# Patient Record
Sex: FEMALE | Race: WHITE | Employment: OTHER | URBAN - METROPOLITAN AREA
[De-identification: names, ages, dates, MRNs, and addresses within clinical notes are randomized per-mention and may not be internally consistent; named-entity substitution may affect disease eponyms.]

---

## 2021-03-19 ENCOUNTER — OFFICE VISIT (OUTPATIENT)
Dept: ORTHOPEDIC SURGERY | Age: 71
End: 2021-03-19
Payer: MEDICARE

## 2021-03-19 VITALS — HEIGHT: 62 IN | WEIGHT: 240 LBS | BODY MASS INDEX: 44.16 KG/M2

## 2021-03-19 DIAGNOSIS — G56.03 BILATERAL CARPAL TUNNEL SYNDROME: Primary | ICD-10-CM

## 2021-03-19 PROCEDURE — 99204 OFFICE O/P NEW MOD 45 MIN: CPT | Performed by: ORTHOPAEDIC SURGERY

## 2021-03-19 RX ORDER — LOSARTAN POTASSIUM 100 MG/1
100 TABLET ORAL DAILY
COMMUNITY
Start: 2021-02-23

## 2021-03-19 RX ORDER — ALLOPURINOL 300 MG/1
TABLET ORAL
COMMUNITY
Start: 2021-03-01

## 2021-03-19 RX ORDER — DILTIAZEM HYDROCHLORIDE 120 MG/1
120 CAPSULE, COATED, EXTENDED RELEASE ORAL DAILY
COMMUNITY
Start: 2021-03-08

## 2021-03-19 RX ORDER — FOLIC ACID 1 MG/1
1 TABLET ORAL DAILY
COMMUNITY

## 2021-03-19 RX ORDER — L-METHYLFOLATE-ALGAE-VIT B12-B6 CAP 3-90.314-2-35 MG 3-90.314-2-35 MG
CAP ORAL DAILY
COMMUNITY

## 2021-03-19 RX ORDER — ATORVASTATIN CALCIUM 20 MG/1
20 TABLET, FILM COATED ORAL DAILY
COMMUNITY
Start: 2021-02-10

## 2021-03-19 RX ORDER — HYDROCHLOROTHIAZIDE 25 MG/1
25 TABLET ORAL DAILY
COMMUNITY

## 2021-03-19 NOTE — PROGRESS NOTES
Natalie Jay is a 78 y.o. female, who presents   Chief Complaint   Patient presents with    Carpal Tunnel     new patient consult for bilateral carpal tunnel. patient states that the left feels worse than the right. HPI[de-identified] Bilateral hand numbness and tingling is been present for some time. The left hand is much worse than the right. She does have a feeling of weakness in the left hand and also has dropped objects. It does wake her at night with tingling and numbness. There has been little if any treatment for this. She has had electrodiagnostic studies done in [place]. She denies other problems with the other upper extremity such as shoulder and elbow problems. The patient is right-hand dominant. Allergies; medications; past medical, surgical, family, and social history; and problem list have been reviewed today and updated as indicated in this encounter - see below following Ortho specifics. Musculoskeletal: Skin condition and circulation is good in both upper extremities. Shoulder and elbow motion and stability are good without pain. Wrist finger and hand motion are good. There is slight decrease in relative muscle mass in the intrinsics of the left hand which is consistent with her right hand dominance. There is some decrease in print and perspiration pattern in the left hand median innervated fingers.  and pinch strength are still good in the hands. Pinprick perception is slightly diminished in the median distribution of the left hand. Provocative tests for median compression such as percussion over the carpal tunnel and wrist flexion as well as arm elevation are positive on the left side. Radiologic Studies: Electrodiagnostic testing done 3/1/2021 showed changes consistent with carpal tunnel syndrome bilaterally, much worse on the left side gauged as moderate by reporting physician. ASSESSMENT:  Giuseppe Brando was seen today for carpal tunnel.     Diagnoses and all orders for this visit:    Bilateral carpal tunnel syndrome     Treatment alternatives were reviewed including medical and physical therapies, injections, and surgical options, expected risks benefits and likely outcome of each were discussed in detail, questions asked and answered and understood. We discussed the symptoms as well as physical findings and the electrodiagnostic testing. The patient was interested in the cause and the anatomy was described as well. We discussed treatment options including bracing, anti-inflammatories by mouth, carpal tunnel injection with corticosteroids and surgical decompression. Andrey Salazar indicates that she would like to have the left hand taken care of in a definitive fashion. She would like to try bracing on the right hand. PLAN: We will schedule left hand carpal tunnel decompression as an outpatient surgical procedure. The patient should get a carpal tunnel brace at any local pharmacy facility. There is no problem list on file for this patient. History reviewed. No pertinent past medical history. History reviewed. No pertinent surgical history. Current Outpatient Medications   Medication Sig Dispense Refill    allopurinol (ZYLOPRIM) 300 MG tablet 150mg daily      atorvastatin (LIPITOR) 20 MG tablet       dilTIAZem (CARDIZEM CD) 120 MG extended release capsule       folic acid (FOLVITE) 1 MG tablet Take 1 mg by mouth daily      hydroCHLOROthiazide (HYDRODIURIL) 25 MG tablet Take 25 mg by mouth daily      losartan (COZAAR) 100 MG tablet       Multiple Vitamin (MULTIVITAMIN ADULT PO) Take by mouth daily      MEGARED OMEGA-3 KRILL OIL PO Take by mouth daily      L-methylfolate-B6-B12 (METANX) 2-01.646-6-17 MG CAPS capsule Take by mouth daily       No current facility-administered medications for this visit.         Allergies   Allergen Reactions    Lisinopril Other (See Comments)    Other Other (See Comments)       Social History     Socioeconomic History  Marital status: Single     Spouse name: None    Number of children: None    Years of education: None    Highest education level: None   Occupational History    None   Social Needs    Financial resource strain: None    Food insecurity     Worry: None     Inability: None    Transportation needs     Medical: None     Non-medical: None   Tobacco Use    Smoking status: Never Smoker    Smokeless tobacco: Never Used   Substance and Sexual Activity    Alcohol use: None    Drug use: None    Sexual activity: None   Lifestyle    Physical activity     Days per week: None     Minutes per session: None    Stress: None   Relationships    Social connections     Talks on phone: None     Gets together: None     Attends Synagogue service: None     Active member of club or organization: None     Attends meetings of clubs or organizations: None     Relationship status: None    Intimate partner violence     Fear of current or ex partner: None     Emotionally abused: None     Physically abused: None     Forced sexual activity: None   Other Topics Concern    None   Social History Narrative    None       History reviewed. No pertinent family history. Review of Systems:   As follows except as previously noted in HPI:  Constitutional: Negative for chills, diaphoresis,  fever   Respiratory: Negative for cough, shortness of breath and wheezing. Cardiovascular: Negative for chest pain and palpitations. Neurological: Negative for dizziness, syncope,   GI / : abdominal pain or cramping  Musculoskeletal: see HPI       Objective:   Physical Exam   Constitutional: Oriented to person, place, and time. and appears well-developed and well-nourished. :   Head: Normocephalic and atraumatic. Neck: Neck supple. Eyes: EOM are normal.   Pulmonary/Chest: Effort normal.  No respiratory distress, no wheezes. Neurological: Alert and oriented to person  Skin: Skin is warm and dry.                Deidra Garzon,     3/19/21 11:51 AM    All reasonable efforts have been made to minimize the risk of errors that may occur in the use of voice recognition and other electronic means of charting.

## 2021-04-09 ENCOUNTER — HOSPITAL ENCOUNTER (OUTPATIENT)
Age: 71
Discharge: HOME | End: 2021-04-09
Payer: MEDICARE

## 2021-04-09 LAB
EKG ATRIAL RATE: 70 BPM
EKG P AXIS: 57 DEGREES
EKG P-R INTERVAL: 192 MS
EKG Q-T INTERVAL: 422 MS
EKG QRS DURATION: 140 MS
EKG QTC CALCULATION (BAZETT): 455 MS
EKG R AXIS: -30 DEGREES
EKG T AXIS: 102 DEGREES
EKG VENTRICULAR RATE: 70 BPM

## 2021-04-09 PROCEDURE — 93005 ELECTROCARDIOGRAM TRACING: CPT | Performed by: GENERAL PRACTICE

## 2021-04-19 ENCOUNTER — TELEPHONE (OUTPATIENT)
Dept: ORTHOPEDIC SURGERY | Age: 71
End: 2021-04-19

## 2021-04-21 NOTE — PROGRESS NOTES
[facility] Cardiology consult  Dr. Luis Fernando Suero      Reason for Consult: Cardiac Clearance  Referring Physician: Rahel Mesa DO     CHIEF COMPLAINT:   Chief Complaint   Patient presents with    Cardiac Clearance     Patient needs CC for carpal tunnel for April 29, 2021  by Dr Alen Albright. Patient has SOB on exertion. HISTORY OF PRESENT ILLNESS:   78year old female with no significant past medical history is here to obtain cardiac clearance due to an abnormal EKG. Shortness of breath with exertion, patient denies any chest pain, no lightheadedness, no dizziness, no palpitations, no pedal edema, no PND, no orthopnea, no syncope, no presyncopal episodes. Past Medical History:   Diagnosis Date    Asthma     Hypertension          Past Surgical History:   Procedure Laterality Date    APPENDECTOMY      GALLBLADDER SURGERY      HEEL SPUR SURGERY Left          Current Outpatient Medications   Medication Sig Dispense Refill    Methotrexate 15 MG/0.6ML SOSY Inject into the skin once a week      aspirin 81 MG chewable tablet Take 81 mg by mouth daily      clobetasol propionate 0.05 % LOTN lotion Apply topically 2 times daily      Boswellia-Glucosamine-Vit D (OSTEO BI-FLEX ONE PER DAY PO) Take by mouth daily      allopurinol (ZYLOPRIM) 300 MG tablet 150mg daily      atorvastatin (LIPITOR) 20 MG tablet Take 20 mg by mouth daily       dilTIAZem (CARDIZEM CD) 120 MG extended release capsule       folic acid (FOLVITE) 1 MG tablet Take 1 mg by mouth daily      hydroCHLOROthiazide (HYDRODIURIL) 25 MG tablet Take 25 mg by mouth daily      losartan (COZAAR) 100 MG tablet Take 100 mg by mouth daily       Multiple Vitamin (MULTIVITAMIN ADULT PO) Take by mouth daily      MEGARED OMEGA-3 KRILL OIL PO Take by mouth daily      L-methylfolate-B6-B12 (METANX) 4-11.049-9-70 MG CAPS capsule Take by mouth daily       No current facility-administered medications for this visit.           Allergies as of 04/22/2021 - Review Complete 04/22/2021   Allergen Reaction Noted    Lisinopril Other (See Comments) 05/21/2017    Other Other (See Comments) 05/21/2017       Social History     Socioeconomic History    Marital status: Single     Spouse name: Not on file    Number of children: Not on file    Years of education: Not on file    Highest education level: Not on file   Occupational History    Not on file   Social Needs    Financial resource strain: Not on file    Food insecurity     Worry: Not on file     Inability: Not on file    Transportation needs     Medical: Not on file     Non-medical: Not on file   Tobacco Use    Smoking status: Never Smoker    Smokeless tobacco: Never Used   Substance and Sexual Activity    Alcohol use: Not Currently     Comment: No caffeine.  Pop once a week    Drug use: Never    Sexual activity: Not on file   Lifestyle    Physical activity     Days per week: Not on file     Minutes per session: Not on file    Stress: Not on file   Relationships    Social connections     Talks on phone: Not on file     Gets together: Not on file     Attends [employer] service: Not on file     Active member of club or organization: Not on file     Attends meetings of clubs or organizations: Not on file     Relationship status: Not on file    Intimate partner violence     Fear of current or ex partner: Not on file     Emotionally abused: Not on file     Physically abused: Not on file     Forced sexual activity: Not on file   Other Topics Concern    Not on file   Social History Narrative    Not on file       Family History   Problem Relation Age of Onset    Stroke Father        REVIEW OF SYSTEMS:     CONSTITUTIONAL:  negative for  fevers, chills, sweats and fatigue  EYES:  negative for  double vision, blurred vision and blind spots  HEENT:  negative for  tinnitus, earaches, nasal congestion and epistaxis  RESPIRATORY:  negative for  dry cough, cough with sputum, dyspnea, wheezing and hemoptysis  CARDIOVASCULAR: as per HPI  GASTROINTESTINAL:  negative for nausea, vomiting, diarrhea, constipation, pruritus and jaundice  GENITOURINARY:  negative for frequency, dysuria, nocturia, urinary incontinence and hesitancy  HEMATOLOGIC/LYMPHATIC:  negative for easy bruising, bleeding, lymphadenopathy and petechiae  ALLERGIC/IMMUNOLOGIC:  negative for urticaria, hay fever and angioedema  ENDOCRINE:  negative for heat intolerance, cold intolerance, tremor, hair loss and diabetic symptoms including neither polyuria nor polydipsia nor blurred vision  MUSCULOSKELETAL:  negative for  myalgias, arthralgias, joint swelling, stiff joints and decreased range of motion  NEUROLOGICAL:  negative for memory problems, speech problems, visual disturbance, dysphagia, weakness and numbness      PHYSICAL EXAM:   CONSTITUTIONAL:  awake, alert, cooperative, no apparent distress, and appears stated age  EYES:  lids and lashes normal and pupils equal, round and reactive to light, anicteric sclerae  HEAD:  normocepalic, without obvious abnormality, atraumatic, pink, moist mucous membranes. NECK:  Supple, symmetrical, trachea midline, no adenopathy, thyroid symmetric, not enlarged and no tenderness, skin normal  HEMATOLOGIC/LYMPHATICS:  no cervical lymphadenopathy and no supraclavicular lymphadenopathy  LUNGS:  No increased work of breathing, good air exchange, clear to auscultation bilaterally, no crackles or wheezing  CARDIOVASCULAR:  Normal apical impulse, regular rate and rhythm, normal S1 and S2, no S3 or S4, and no murmur noted and no JVD, no carotid bruit, no pedal edema, good carotid upstroke bilaterally. ABDOMEN:  Soft, nontender, no masses, no hepatomegaly or splenomegaly, BS+  CHEST: nontender to palpation, expands symmetrically  MUSCULOSKELETAL:  No clubbing no cyanosis. there is no redness, warmth, or swelling of the joints  NEUROLOGIC:  Alert, awake,oriented x3.   SKIN:  no bruising or bleeding, normal skin color, texture, turgor and no redness, warmth, or swelling    /78 (Site: Right Upper Arm, Position: Sitting, Cuff Size: Large Adult)   Pulse 72   Ht 5' 2\" (1.575 m)   Wt 244 lb (110.7 kg)   BMI 44.63 kg/m²     DATA:   I personally reviewed the visit EKG with the following interpretation: Sinus rhythm, normal axis, left bundle branch block    EKG 4/9/21 Normal sinus rhythm  Left axis deviation  Left bundle branch block  Abnormal ECG  No previous ECGs available    ECHO:   Stress Test:   Angiography:   Cardiology Labs: BMP:    Lab Results   Component Value Date     04/09/2021    K 4.1 04/09/2021     04/09/2021    CO2 29 04/09/2021    BUN 20 04/09/2021    CREATININE 0.9 04/09/2021     CMP:    Lab Results   Component Value Date     04/09/2021    K 4.1 04/09/2021     04/09/2021    CO2 29 04/09/2021    BUN 20 04/09/2021    CREATININE 0.9 04/09/2021     CBC:    Lab Results   Component Value Date    WBC 7.3 04/09/2021    RBC 4.55 04/09/2021    HGB 12.9 04/09/2021    HCT 41.9 04/09/2021    MCV 92.1 04/09/2021    RDW 13.4 04/09/2021     04/09/2021     PT/INR:  No results found for: PTINR  PT/INR Warfarin:  No components found for: PTPATWAR, PTINRWAR  PTT:  No results found for: APTT  PTT Heparin:  No components found for: APTTHEP  Magnesium:  No results found for: MG  TSH:  No results found for: TSH  TROPONIN:  No components found for: TROP  BNP:  No results found for: BNP  FASTING LIPID PANEL:  No results found for: CHOL, HDL, TRIG  NM Cardiac Stress Test Nuclear Imaging    (Results Pending)     I have personally reviewed the laboratory, cardiac diagnostic and radiographic testing as outlined above:      IMPRESSION:  1. Dyspnea on exertion: Etiology?,  Will schedule for Lexiscan stress test for further evaluation  2. Abnormal EKG with a left bundle branch block: As above. 3.  Hypertension: Controlled  4.   Preoperative cardiac evaluation prior to carpal tunnel surgery    RECOMMENDATIONS: 1. Lexiscan stress test  2. Patient was strongly advised to call 911 if symptoms recur or get worse for any reason  3. Continue current treatment  4. Preoperative cardiac recommendations will be pending her Lexiscan stress test results    I have reviewed my findings and recommendations with patient    Thank you for the consult  Electronically signed by Elzbieta Medrano MD on 5/8/2021 at 3:04 PM      NOTE: This report was transcribed using voice recognition software.  Every effort was made to ensure accuracy; however, inadvertent computerized transcription errors may be present

## 2021-04-22 ENCOUNTER — OFFICE VISIT (OUTPATIENT)
Dept: CARDIOLOGY CLINIC | Age: 71
End: 2021-04-22
Payer: MEDICARE

## 2021-04-22 VITALS
HEIGHT: 62 IN | SYSTOLIC BLOOD PRESSURE: 122 MMHG | DIASTOLIC BLOOD PRESSURE: 78 MMHG | WEIGHT: 244 LBS | BODY MASS INDEX: 44.9 KG/M2 | HEART RATE: 72 BPM

## 2021-04-22 DIAGNOSIS — R94.31 ABNORMAL EKG: Primary | ICD-10-CM

## 2021-04-22 DIAGNOSIS — R06.02 SHORTNESS OF BREATH: ICD-10-CM

## 2021-04-22 PROCEDURE — 93000 ELECTROCARDIOGRAM COMPLETE: CPT | Performed by: INTERNAL MEDICINE

## 2021-04-22 PROCEDURE — 99204 OFFICE O/P NEW MOD 45 MIN: CPT | Performed by: INTERNAL MEDICINE

## 2021-04-22 RX ORDER — ASPIRIN 81 MG/1
81 TABLET, CHEWABLE ORAL DAILY
COMMUNITY

## 2021-04-22 RX ORDER — CLOBETASOL PROPIONATE 0.5 MG/ML
LOTION TOPICAL 2 TIMES DAILY
COMMUNITY

## 2021-05-13 ENCOUNTER — TELEPHONE (OUTPATIENT)
Dept: CARDIOLOGY | Age: 71
End: 2021-05-13

## 2021-05-13 NOTE — TELEPHONE ENCOUNTER
Left a message on patients home phone reminder of appointment on 5/18/21 at 9:30 for a Pharmacological stress test instructions and COVID checklist left patient asked to call with any questions.

## 2021-05-18 ENCOUNTER — HOSPITAL ENCOUNTER (OUTPATIENT)
Dept: CARDIOLOGY | Age: 71
Discharge: HOME | End: 2021-05-18
Payer: MEDICARE

## 2021-05-18 VITALS
BODY MASS INDEX: 44.9 KG/M2 | DIASTOLIC BLOOD PRESSURE: 92 MMHG | RESPIRATION RATE: 20 BRPM | SYSTOLIC BLOOD PRESSURE: 149 MMHG | WEIGHT: 244 LBS | HEIGHT: 62 IN | HEART RATE: 73 BPM

## 2021-05-18 DIAGNOSIS — R06.02 SHORTNESS OF BREATH: ICD-10-CM

## 2021-05-18 PROCEDURE — 93017 CV STRESS TEST TRACING ONLY: CPT

## 2021-05-18 PROCEDURE — 93016 CV STRESS TEST SUPVJ ONLY: CPT | Performed by: INTERNAL MEDICINE

## 2021-05-18 PROCEDURE — 78452 HT MUSCLE IMAGE SPECT MULT: CPT | Performed by: INTERNAL MEDICINE

## 2021-05-18 PROCEDURE — 93018 CV STRESS TEST I&R ONLY: CPT | Performed by: INTERNAL MEDICINE

## 2021-05-18 PROCEDURE — A9502 TC99M TETROFOSMIN: HCPCS | Performed by: INTERNAL MEDICINE

## 2021-05-18 PROCEDURE — 78452 HT MUSCLE IMAGE SPECT MULT: CPT

## 2021-05-18 PROCEDURE — 6360000002 HC RX W HCPCS: Performed by: INTERNAL MEDICINE

## 2021-05-18 PROCEDURE — 3430000000 HC RX DIAGNOSTIC RADIOPHARMACEUTICAL: Performed by: INTERNAL MEDICINE

## 2021-05-18 PROCEDURE — 2580000003 HC RX 258: Performed by: INTERNAL MEDICINE

## 2021-05-18 RX ORDER — SODIUM CHLORIDE 0.9 % (FLUSH) 0.9 %
10 SYRINGE (ML) INJECTION PRN
Status: DISCONTINUED | OUTPATIENT
Start: 2021-05-18 | End: 2021-05-19 | Stop reason: HOSPADM

## 2021-05-18 RX ADMIN — TETROFOSMIN 32 MILLICURIE: 0.23 INJECTION, POWDER, LYOPHILIZED, FOR SOLUTION INTRAVENOUS at 11:05

## 2021-05-18 RX ADMIN — REGADENOSON 0.4 MG: 0.08 INJECTION, SOLUTION INTRAVENOUS at 11:05

## 2021-05-18 RX ADMIN — TETROFOSMIN 10.7 MILLICURIE: 0.23 INJECTION, POWDER, LYOPHILIZED, FOR SOLUTION INTRAVENOUS at 09:43

## 2021-05-18 RX ADMIN — SODIUM CHLORIDE, PRESERVATIVE FREE 10 ML: 5 INJECTION INTRAVENOUS at 09:43

## 2021-05-18 RX ADMIN — SODIUM CHLORIDE, PRESERVATIVE FREE 10 ML: 5 INJECTION INTRAVENOUS at 11:04

## 2021-05-18 RX ADMIN — SODIUM CHLORIDE, PRESERVATIVE FREE 10 ML: 5 INJECTION INTRAVENOUS at 11:05

## 2021-05-18 NOTE — PROCEDURES
stress and rest imaging in the short, vertical long, and horizontal long axis demonstrated very small septal and anterior apical reversible defect most likely represents artifacts     Gated SPECT left ventricular ejection fraction was calculated to be 69%, with normal myocardial thickening and wall motion and Paradoxical septal wall motion. Impression:    1. Electrocardiographically Left bundle branch block regadenoson infusion with a clinically Nondiagnostic response  2. Myocardial perfusion imaging was normal with attenuation artifact. 3. Overall left ventricular systolic function was normal with a paradoxical septal wall motion  4. Low risk general pharmacologic stress test.    Thank you for sending your patient to this [facility].      Electronically signed by Vinod Melvin MD on 5/19/21 at 4:06 PM EDT

## 2021-05-20 ENCOUNTER — TELEPHONE (OUTPATIENT)
Dept: CARDIOLOGY CLINIC | Age: 71
End: 2021-05-20

## 2021-05-20 NOTE — TELEPHONE ENCOUNTER
Normal stress results given to patient - needs cardiac clearance for CTR and has no scheduled follow up

## 2021-06-08 ENCOUNTER — HOSPITAL ENCOUNTER (OUTPATIENT)
Dept: INTERVENTIONAL RADIOLOGY/VASCULAR | Age: 71
Discharge: HOME | End: 2021-06-10
Payer: MEDICARE

## 2021-06-08 DIAGNOSIS — R60.0 LOCALIZED EDEMA: ICD-10-CM

## 2021-06-08 DIAGNOSIS — I87.2 PERIPHERAL VENOUS INSUFFICIENCY: ICD-10-CM

## 2021-06-08 PROCEDURE — 93970 EXTREMITY STUDY: CPT

## 2021-06-16 RX ORDER — LEFLUNOMIDE 10 MG/1
20 TABLET ORAL DAILY
COMMUNITY

## 2021-06-21 ENCOUNTER — ANESTHESIA EVENT (OUTPATIENT)
Dept: OPERATING ROOM | Age: 71
End: 2021-06-21
Payer: MEDICARE

## 2021-06-23 ASSESSMENT — LIFESTYLE VARIABLES: SMOKING_STATUS: 0

## 2021-06-23 NOTE — ANESTHESIA PRE PROCEDURE
Department of Anesthesiology  Preprocedure Note       Name:  Lorenzo Hardwick   Age:  78 y.o.  :  1950                                          MRN:  [id number]         Date:  2021      Surgeon: Domenic Cheung):  TINY Aponte DO    Procedure: Procedure(s):  RELEASE TRANSVERSE CARPAL LIGAMENT-LEFT    Medications prior to admission:   Prior to Admission medications    Medication Sig Start Date End Date Taking?  Authorizing Provider   leflunomide (ARAVA) 10 MG tablet Take 20 mg by mouth daily   Yes Historical Provider, MD   NONFORMULARY daily Protandim   Yes Historical Provider, MD   Methotrexate 15 MG/0.6ML SOSY Inject into the skin once a week Tuesday evenings   Yes Historical Provider, MD   aspirin 81 MG chewable tablet Take 81 mg by mouth daily Takes on her own prophylactic stopped 1 week pre op   Yes Historical Provider, MD   Boswellia-Glucosamine-Vit D (OSTEO BI-FLEX ONE PER DAY PO) Take by mouth daily   Yes Historical Provider, MD   allopurinol (ZYLOPRIM) 300 MG tablet 150mg daily 3/1/21  Yes Historical Provider, MD   atorvastatin (LIPITOR) 20 MG tablet Take 20 mg by mouth daily  2/10/21  Yes Historical Provider, MD   dilTIAZem (CARDIZEM CD) 120 MG extended release capsule Take 120 mg by mouth daily am 3/8/21  Yes Historical Provider, MD   folic acid (FOLVITE) 1 MG tablet Take 1 mg by mouth daily   Yes Historical Provider, MD   hydroCHLOROthiazide (HYDRODIURIL) 25 MG tablet Take 25 mg by mouth daily   Yes Historical Provider, MD   losartan (COZAAR) 100 MG tablet Take 100 mg by mouth daily am 21  Yes Historical Provider, MD   Multiple Vitamin (MULTIVITAMIN ADULT PO) Take by mouth daily   Yes Historical Provider, MD   MEGARED OMEGA-3 KRILL OIL PO Take by mouth daily   Yes Historical Provider, MD   L-methylfolate-B6-B12 [facility]) 7-11.821-6-67 MG CAPS capsule Take by mouth daily   Yes Historical Provider, MD   clobetasol propionate 0.05 % LOTN lotion Apply topically 2 times daily    Historical Provider, MD       Current medications:    No current facility-administered medications for this encounter. Current Outpatient Medications   Medication Sig Dispense Refill    leflunomide (ARAVA) 10 MG tablet Take 20 mg by mouth daily      NONFORMULARY daily Protandim      Methotrexate 15 MG/0.6ML SOSY Inject into the skin once a week Tuesday evenings      aspirin 81 MG chewable tablet Take 81 mg by mouth daily Takes on her own prophylactic stopped 1 week pre op      Boswellia-Glucosamine-Vit D (OSTEO BI-FLEX ONE PER DAY PO) Take by mouth daily      allopurinol (ZYLOPRIM) 300 MG tablet 150mg daily      atorvastatin (LIPITOR) 20 MG tablet Take 20 mg by mouth daily       dilTIAZem (CARDIZEM CD) 120 MG extended release capsule Take 120 mg by mouth daily am      folic acid (FOLVITE) 1 MG tablet Take 1 mg by mouth daily      hydroCHLOROthiazide (HYDRODIURIL) 25 MG tablet Take 25 mg by mouth daily      losartan (COZAAR) 100 MG tablet Take 100 mg by mouth daily am      Multiple Vitamin (MULTIVITAMIN ADULT PO) Take by mouth daily      MEGARED OMEGA-3 KRILL OIL PO Take by mouth daily      L-methylfolate-B6-B12 (METANX) 7-14.041-8-77 MG CAPS capsule Take by mouth daily      clobetasol propionate 0.05 % LOTN lotion Apply topically 2 times daily         Allergies: Allergies   Allergen Reactions    Lisinopril Other (See Comments)     Caused a cough    Other Other (See Comments)     Suldene  caaused disoriention       Problem List:  There is no problem list on file for this patient.       Past Medical History:        Diagnosis Date    Arthritis     rheumatoid    Asthma     Diabetes mellitus ([address]     diet controlled    Hyperlipidemia     Hypertension        Past Surgical History:        Procedure Laterality Date    APPENDECTOMY      COLONOSCOPY      ENDOSCOPY, COLON, DIAGNOSTIC      GALLBLADDER SURGERY      HEEL SPUR SURGERY Left        Social History:    Social History     Tobacco Use    Smoking status: Never Smoker    Smokeless tobacco: Never Used   Substance Use Topics    Alcohol use: Not Currently                                Counseling given: Not Answered      Vital Signs (Current):   Vitals:    06/16/21 1523   Weight: 230 lb (104.3 kg)   Height: 5' 2\" (1.575 m)                                              BP Readings from Last 3 Encounters:   05/18/21 (!) 149/92   04/22/21 122/78       NPO Status:  >8.H                                                                               BMI:   Wt Readings from Last 3 Encounters:   05/18/21 244 lb (110.7 kg)   04/22/21 244 lb (110.7 kg)   03/19/21 240 lb (108.9 kg)     Body mass index is 42.07 kg/m². CBC:   Lab Results   Component Value Date    WBC 7.3 04/09/2021    RBC 4.55 04/09/2021    HGB 12.9 04/09/2021    HCT 41.9 04/09/2021    MCV 92.1 04/09/2021    RDW 13.4 04/09/2021     04/09/2021       CMP:   Lab Results   Component Value Date     04/09/2021    K 4.1 04/09/2021     04/09/2021    CO2 29 04/09/2021    BUN 20 04/09/2021    CREATININE 0.9 04/09/2021    GFRAA >60 04/09/2021    LABGLOM >60 04/09/2021    GLUCOSE 124 04/09/2021    CALCIUM 10.5 04/09/2021       POC Tests: No results for input(s): POCGLU, POCNA, POCK, POCCL, POCBUN, POCHEMO, POCHCT in the last 72 hours.     Coags: No results found for: PROTIME, INR, APTT    HCG (If Applicable): No results found for: PREGTESTUR, PREGSERUM, HCG, HCGQUANT     ABGs: No results found for: PHART, PO2ART, PWV4JPL, GDK5BOH, BEART, B5BPZCUX     Type & Screen (If Applicable):  No results found for: LABABO, LABRH    Drug/Infectious Status (If Applicable):  No results found for: HIV, HEPCAB    COVID-19 Screening (If Applicable): No results found for: COVID19        Anesthesia Evaluation  Patient summary reviewed no history of anesthetic complications:   Airway: Mallampati: I  TM distance: >3 FB   Neck ROM: full  Mouth opening: > = 3 FB Dental: normal exam         Pulmonary: breath sounds clear to auscultation  (+) asthma:     (-) not a current smoker                           Cardiovascular:  Exercise tolerance: good (>4 METS),   (+) hypertension:, hyperlipidemia      ECG reviewed  Rhythm: regular  Rate: normal           Beta Blocker:  Not on Beta Blocker      ROS comment: EKG 4/2021=Sinus  Rhythm   -Left bundle branch block. ABNORMAL     Neuro/Psych:               GI/Hepatic/Renal:   (+) morbid obesity          Endo/Other:    (+) DiabetesType II DM, , : arthritis: rheumatoid. , .                 Abdominal:   (+) obese,         Vascular:                                      Anesthesia Plan      Tiff block     ASA 3       Induction: intravenous. Anesthetic plan and risks discussed with patient. Plan discussed with CRNA.                 Larissa Brown MD   6/23/2021

## 2021-06-24 ENCOUNTER — HOSPITAL ENCOUNTER (OUTPATIENT)
Age: 71
Setting detail: OUTPATIENT SURGERY
Discharge: HOME | End: 2021-06-24
Attending: ORTHOPAEDIC SURGERY | Admitting: ORTHOPAEDIC SURGERY
Payer: MEDICARE

## 2021-06-24 ENCOUNTER — ANESTHESIA (OUTPATIENT)
Dept: OPERATING ROOM | Age: 71
End: 2021-06-24
Payer: MEDICARE

## 2021-06-24 VITALS
SYSTOLIC BLOOD PRESSURE: 110 MMHG | TEMPERATURE: 98.6 F | OXYGEN SATURATION: 95 % | RESPIRATION RATE: 24 BRPM | DIASTOLIC BLOOD PRESSURE: 63 MMHG

## 2021-06-24 VITALS
TEMPERATURE: 98 F | WEIGHT: 234 LBS | HEIGHT: 62 IN | RESPIRATION RATE: 14 BRPM | DIASTOLIC BLOOD PRESSURE: 71 MMHG | OXYGEN SATURATION: 98 % | BODY MASS INDEX: 43.06 KG/M2 | SYSTOLIC BLOOD PRESSURE: 137 MMHG | HEART RATE: 14 BPM

## 2021-06-24 DIAGNOSIS — G56.02 CARPAL TUNNEL SYNDROME, LEFT: Primary | ICD-10-CM

## 2021-06-24 LAB — METER GLUCOSE: 139 MG/DL (ref 74–99)

## 2021-06-24 PROCEDURE — 2580000003 HC RX 258: Performed by: NURSE ANESTHETIST, CERTIFIED REGISTERED

## 2021-06-24 PROCEDURE — 82962 GLUCOSE BLOOD TEST: CPT

## 2021-06-24 PROCEDURE — 3600000002 HC SURGERY LEVEL 2 BASE: Performed by: ORTHOPAEDIC SURGERY

## 2021-06-24 PROCEDURE — 7100000010 HC PHASE II RECOVERY - FIRST 15 MIN: Performed by: ORTHOPAEDIC SURGERY

## 2021-06-24 PROCEDURE — 6370000000 HC RX 637 (ALT 250 FOR IP)

## 2021-06-24 PROCEDURE — 6360000002 HC RX W HCPCS: Performed by: NURSE ANESTHETIST, CERTIFIED REGISTERED

## 2021-06-24 PROCEDURE — 3600000012 HC SURGERY LEVEL 2 ADDTL 15MIN: Performed by: ORTHOPAEDIC SURGERY

## 2021-06-24 PROCEDURE — 64721 CARPAL TUNNEL SURGERY: CPT | Performed by: ORTHOPAEDIC SURGERY

## 2021-06-24 PROCEDURE — 7100000011 HC PHASE II RECOVERY - ADDTL 15 MIN: Performed by: ORTHOPAEDIC SURGERY

## 2021-06-24 PROCEDURE — 3700000001 HC ADD 15 MINUTES (ANESTHESIA): Performed by: ORTHOPAEDIC SURGERY

## 2021-06-24 PROCEDURE — 3700000000 HC ANESTHESIA ATTENDED CARE: Performed by: ORTHOPAEDIC SURGERY

## 2021-06-24 PROCEDURE — 2580000003 HC RX 258: Performed by: ANESTHESIOLOGY

## 2021-06-24 PROCEDURE — 82962 GLUCOSE BLOOD TEST: CPT | Performed by: ORTHOPAEDIC SURGERY

## 2021-06-24 PROCEDURE — 2709999900 HC NON-CHARGEABLE SUPPLY: Performed by: ORTHOPAEDIC SURGERY

## 2021-06-24 PROCEDURE — 2500000003 HC RX 250 WO HCPCS: Performed by: NURSE ANESTHETIST, CERTIFIED REGISTERED

## 2021-06-24 RX ORDER — FENTANYL CITRATE 50 UG/ML
INJECTION, SOLUTION INTRAMUSCULAR; INTRAVENOUS PRN
Status: DISCONTINUED | OUTPATIENT
Start: 2021-06-24 | End: 2021-06-24 | Stop reason: SDUPTHER

## 2021-06-24 RX ORDER — FENTANYL CITRATE 50 UG/ML
50 INJECTION, SOLUTION INTRAMUSCULAR; INTRAVENOUS EVERY 5 MIN PRN
Status: DISCONTINUED | OUTPATIENT
Start: 2021-06-24 | End: 2021-06-24 | Stop reason: HOSPADM

## 2021-06-24 RX ORDER — LIDOCAINE HYDROCHLORIDE 5 MG/ML
INJECTION, SOLUTION INFILTRATION; INTRAVENOUS PRN
Status: DISCONTINUED | OUTPATIENT
Start: 2021-06-24 | End: 2021-06-24 | Stop reason: SDUPTHER

## 2021-06-24 RX ORDER — PROPOFOL 10 MG/ML
INJECTION, EMULSION INTRAVENOUS PRN
Status: DISCONTINUED | OUTPATIENT
Start: 2021-06-24 | End: 2021-06-24 | Stop reason: SDUPTHER

## 2021-06-24 RX ORDER — LIDOCAINE HYDROCHLORIDE 20 MG/ML
INJECTION, SOLUTION INTRAVENOUS PRN
Status: DISCONTINUED | OUTPATIENT
Start: 2021-06-24 | End: 2021-06-24 | Stop reason: SDUPTHER

## 2021-06-24 RX ORDER — DIPHENHYDRAMINE HYDROCHLORIDE 50 MG/ML
12.5 INJECTION INTRAMUSCULAR; INTRAVENOUS
Status: DISCONTINUED | OUTPATIENT
Start: 2021-06-24 | End: 2021-06-24 | Stop reason: HOSPADM

## 2021-06-24 RX ORDER — ACETAMINOPHEN AND CODEINE PHOSPHATE 300; 30 MG/1; MG/1
1 TABLET ORAL EVERY 4 HOURS PRN
Qty: 30 TABLET | Refills: 0 | Status: SHIPPED | OUTPATIENT
Start: 2021-06-24 | End: 2021-07-01

## 2021-06-24 RX ORDER — MEPERIDINE HYDROCHLORIDE 25 MG/ML
12.5 INJECTION INTRAMUSCULAR; INTRAVENOUS; SUBCUTANEOUS EVERY 5 MIN PRN
Status: DISCONTINUED | OUTPATIENT
Start: 2021-06-24 | End: 2021-06-24 | Stop reason: HOSPADM

## 2021-06-24 RX ORDER — MORPHINE SULFATE 2 MG/ML
1 INJECTION, SOLUTION INTRAMUSCULAR; INTRAVENOUS EVERY 5 MIN PRN
Status: DISCONTINUED | OUTPATIENT
Start: 2021-06-24 | End: 2021-06-24 | Stop reason: HOSPADM

## 2021-06-24 RX ORDER — PROMETHAZINE HYDROCHLORIDE 25 MG/ML
25 INJECTION, SOLUTION INTRAMUSCULAR; INTRAVENOUS
Status: DISCONTINUED | OUTPATIENT
Start: 2021-06-24 | End: 2021-06-24 | Stop reason: HOSPADM

## 2021-06-24 RX ORDER — SODIUM CHLORIDE, SODIUM LACTATE, POTASSIUM CHLORIDE, CALCIUM CHLORIDE 600; 310; 30; 20 MG/100ML; MG/100ML; MG/100ML; MG/100ML
INJECTION, SOLUTION INTRAVENOUS CONTINUOUS
Status: DISCONTINUED | OUTPATIENT
Start: 2021-06-24 | End: 2021-06-24 | Stop reason: HOSPADM

## 2021-06-24 RX ORDER — LABETALOL HYDROCHLORIDE 5 MG/ML
5 INJECTION, SOLUTION INTRAVENOUS EVERY 10 MIN PRN
Status: DISCONTINUED | OUTPATIENT
Start: 2021-06-24 | End: 2021-06-24 | Stop reason: HOSPADM

## 2021-06-24 RX ORDER — ACETAMINOPHEN AND CODEINE PHOSPHATE 300; 30 MG/1; MG/1
1 TABLET ORAL EVERY 6 HOURS PRN
Status: DISCONTINUED | OUTPATIENT
Start: 2021-06-24 | End: 2021-06-24 | Stop reason: HOSPADM

## 2021-06-24 RX ORDER — ACETAMINOPHEN AND CODEINE PHOSPHATE 300; 30 MG/1; MG/1
TABLET ORAL
Status: COMPLETED
Start: 2021-06-24 | End: 2021-06-24

## 2021-06-24 RX ORDER — OXYCODONE HYDROCHLORIDE AND ACETAMINOPHEN 5; 325 MG/1; MG/1
1 TABLET ORAL EVERY 4 HOURS PRN
Status: DISCONTINUED | OUTPATIENT
Start: 2021-06-24 | End: 2021-06-24 | Stop reason: HOSPADM

## 2021-06-24 RX ORDER — HYDROCODONE BITARTRATE AND ACETAMINOPHEN 5; 325 MG/1; MG/1
1 TABLET ORAL PRN
Status: DISCONTINUED | OUTPATIENT
Start: 2021-06-24 | End: 2021-06-24 | Stop reason: HOSPADM

## 2021-06-24 RX ORDER — PROPOFOL 10 MG/ML
INJECTION, EMULSION INTRAVENOUS CONTINUOUS PRN
Status: DISCONTINUED | OUTPATIENT
Start: 2021-06-24 | End: 2021-06-24 | Stop reason: SDUPTHER

## 2021-06-24 RX ORDER — HYDRALAZINE HYDROCHLORIDE 20 MG/ML
5 INJECTION INTRAMUSCULAR; INTRAVENOUS EVERY 10 MIN PRN
Status: DISCONTINUED | OUTPATIENT
Start: 2021-06-24 | End: 2021-06-24 | Stop reason: HOSPADM

## 2021-06-24 RX ORDER — SODIUM CHLORIDE, SODIUM LACTATE, POTASSIUM CHLORIDE, CALCIUM CHLORIDE 600; 310; 30; 20 MG/100ML; MG/100ML; MG/100ML; MG/100ML
INJECTION, SOLUTION INTRAVENOUS CONTINUOUS PRN
Status: DISCONTINUED | OUTPATIENT
Start: 2021-06-24 | End: 2021-06-24 | Stop reason: SDUPTHER

## 2021-06-24 RX ORDER — HYDROCODONE BITARTRATE AND ACETAMINOPHEN 5; 325 MG/1; MG/1
2 TABLET ORAL PRN
Status: DISCONTINUED | OUTPATIENT
Start: 2021-06-24 | End: 2021-06-24 | Stop reason: HOSPADM

## 2021-06-24 RX ORDER — MIDAZOLAM HYDROCHLORIDE 1 MG/ML
INJECTION INTRAMUSCULAR; INTRAVENOUS PRN
Status: DISCONTINUED | OUTPATIENT
Start: 2021-06-24 | End: 2021-06-24 | Stop reason: SDUPTHER

## 2021-06-24 RX ADMIN — ACETAMINOPHEN AND CODEINE PHOSPHATE 1 TABLET: 300; 30 TABLET ORAL at 10:21

## 2021-06-24 RX ADMIN — LIDOCAINE HYDROCHLORIDE 40 MG: 20 INJECTION, SOLUTION INTRAVENOUS at 09:24

## 2021-06-24 RX ADMIN — SODIUM CHLORIDE, POTASSIUM CHLORIDE, SODIUM LACTATE AND CALCIUM CHLORIDE: 600; 310; 30; 20 INJECTION, SOLUTION INTRAVENOUS at 08:39

## 2021-06-24 RX ADMIN — FENTANYL CITRATE 50 MCG: 50 INJECTION, SOLUTION INTRAMUSCULAR; INTRAVENOUS at 09:17

## 2021-06-24 RX ADMIN — MIDAZOLAM 2 MG: 1 INJECTION INTRAMUSCULAR; INTRAVENOUS at 09:15

## 2021-06-24 RX ADMIN — SODIUM CHLORIDE, POTASSIUM CHLORIDE, SODIUM LACTATE AND CALCIUM CHLORIDE: 600; 310; 30; 20 INJECTION, SOLUTION INTRAVENOUS at 08:49

## 2021-06-24 RX ADMIN — LIDOCAINE HYDROCHLORIDE 50 ML: 5 INJECTION, SOLUTION INFILTRATION; INTRAVENOUS at 09:22

## 2021-06-24 RX ADMIN — PROPOFOL INJECTABLE EMULSION 40 MG: 10 INJECTION, EMULSION INTRAVENOUS at 09:24

## 2021-06-24 RX ADMIN — FENTANYL CITRATE 50 MCG: 50 INJECTION, SOLUTION INTRAMUSCULAR; INTRAVENOUS at 09:16

## 2021-06-24 RX ADMIN — PROPOFOL INJECTABLE EMULSION 120 MCG/KG/MIN: 10 INJECTION, EMULSION INTRAVENOUS at 09:24

## 2021-06-24 ASSESSMENT — PULMONARY FUNCTION TESTS
PIF_VALUE: 1
PIF_VALUE: 0
PIF_VALUE: 0
PIF_VALUE: 1
PIF_VALUE: 0
PIF_VALUE: 1
PIF_VALUE: 0
PIF_VALUE: 0
PIF_VALUE: 1

## 2021-06-24 ASSESSMENT — PAIN DESCRIPTION - LOCATION
LOCATION: ARM
LOCATION: HAND
LOCATION: HAND

## 2021-06-24 ASSESSMENT — PAIN DESCRIPTION - PAIN TYPE
TYPE: SURGICAL PAIN

## 2021-06-24 ASSESSMENT — PAIN SCALES - GENERAL
PAINLEVEL_OUTOF10: 5
PAINLEVEL_OUTOF10: 6
PAINLEVEL_OUTOF10: 0
PAINLEVEL_OUTOF10: 5

## 2021-06-24 ASSESSMENT — PAIN DESCRIPTION - ORIENTATION
ORIENTATION: LEFT
ORIENTATION: RIGHT
ORIENTATION: LEFT

## 2021-06-24 ASSESSMENT — PAIN DESCRIPTION - PROGRESSION
CLINICAL_PROGRESSION: NOT CHANGED
CLINICAL_PROGRESSION: NOT CHANGED

## 2021-06-24 ASSESSMENT — PAIN DESCRIPTION - FREQUENCY
FREQUENCY: CONTINUOUS

## 2021-06-24 ASSESSMENT — PAIN DESCRIPTION - ONSET
ONSET: AWAKENED FROM SLEEP

## 2021-06-24 ASSESSMENT — PAIN DESCRIPTION - DESCRIPTORS
DESCRIPTORS: ACHING;BURNING;CONSTANT;SHARP;SHOOTING
DESCRIPTORS: ACHING;BURNING;CONSTANT

## 2021-06-24 ASSESSMENT — PAIN - FUNCTIONAL ASSESSMENT: PAIN_FUNCTIONAL_ASSESSMENT: 0-10

## 2021-06-24 NOTE — ANESTHESIA POSTPROCEDURE EVALUATION
Department of Anesthesiology  Postprocedure Note    Patient: Evorn Grief  MRN: [id number]  YOB: 1950  Date of evaluation: 6/24/2021  Time:  10:37 AM     Procedure Summary     Date: 06/24/21 Room / Location: [address] [place] [address] 01 / [address]    Anesthesia Start: 0915 Anesthesia Stop: 1003    Procedure: RELEASE TRANSVERSE CARPAL LIGAMENT-LEFT (Left ) Diagnosis: (LEFT CARPAL TUNNEL SYNDROME)    Surgeons: Leland Polanco DO Responsible Provider: Arturo Liu MD    Anesthesia Type: Tiff block ASA Status: 3          Anesthesia Type: Tiff block    Mechelle Phase I: Mechelle Score: 10    Mechelle Phase II: Mechelle Score: 10    Last vitals: Reviewed and per EMR flowsheets.        Anesthesia Post Evaluation    Patient location during evaluation: PACU  Patient participation: complete - patient participated  Level of consciousness: awake and alert  Airway patency: patent  Nausea & Vomiting: no nausea and no vomiting  Complications: no  Cardiovascular status: hemodynamically stable  Respiratory status: room air and spontaneous ventilation  Hydration status: stable

## 2021-06-24 NOTE — H&P
History and Physical      CHIEF COMPLAINT: Numbness tingling left hand    HISTORY OF PRESENT ILLNESS:      Progressive worsening    Past Medical History:        Diagnosis Date    Arthritis     rheumatoid    Asthma     Diabetes mellitus ([address]     diet controlled    Hyperlipidemia     Hypertension      Past Surgical History:        Procedure Laterality Date    APPENDECTOMY      COLONOSCOPY      ENDOSCOPY, COLON, DIAGNOSTIC      GALLBLADDER SURGERY      HEEL SPUR SURGERY Left      Social History:    TOBACCO:   reports that she has never smoked. She has never used smokeless tobacco.  ETOH:   reports previous alcohol use. DRUGS:   reports no history of drug use. Family History:       Problem Relation Age of Onset    Other Mother         brain anyseurym    Stroke Father     Atrial Fibrillation Brother     Atrial Fibrillation Brother     Stroke Maternal Grandfather      Medications Prior to Admission:  No medications prior to admission. Allergies:  Lisinopril and Other    CONSTITUTIONAL:  negative for  chills and anorexia  HEENT:  negative for  tinnitus  RESPIRATORY:  negative for  dyspnea and cyanosis  CARDIOVASCULAR:  negative for  palpitations, syncope  GASTROINTESTINAL:  negative for vomiting and hematemesis  GENITOURINARY:  negative for hematuria  ENDOCRINE:  negative for tremor  MUSCULOSKELETAL:  negative for  bone pain  NEUROLOGICAL:  positive for numbness and tingling  BEHAVIOR/PSYCH:  negative for agitated and anxiety    PHYSICAL EXAM:  Ht 5' 2\" (1.575 m)   Wt 230 lb (104.3 kg)   BMI 42.07 kg/m²   General appearance:  awake, alert, cooperative, no apparent distress, and appears stated age  Neurologic:  Awake, alert, oriented to name, place and time. Cranial nerves II-XII are grossly intact. Motor is 5 out of 5 bilaterally. Cerebellar finger to nose, heel to shin intact. Sensory is intact.   Babinski down going, Romberg negative, and gait is normal.  Lungs:  No increased work of breathing, good air exchange, clear to auscultation bilaterally, no crackles or wheezing  Heart:  Normal apical impulse, regular rate and rhythm, normal S1 and S2, no S3 or S4, and no murmur noted  Abdomen:  normal bowel sounds  Skin: warm and dry, no rash or erythema  ENT: tympanic membrane, external ear and ear canal normal bilaterally, oropharynx clear and moist with normal mucous membranes  Musculoskeletal: normal range of motion, no joint swelling, deformity or tenderness    General Labs:  CBC:   Lab Results   Component Value Date    WBC 7.3 04/09/2021    RBC 4.55 04/09/2021    HGB 12.9 04/09/2021    HCT 41.9 04/09/2021    MCV 92.1 04/09/2021    RDW 13.4 04/09/2021     04/09/2021     CMP:    Lab Results   Component Value Date     04/09/2021    K 4.1 04/09/2021     04/09/2021    CO2 29 04/09/2021    BUN 20 04/09/2021     U/A:  No components found for: [place], USPGRAV, UPH, UPROTEIN, UGLUCOSE, UKETONE, UBILI, UBLOOD, [place], UUROBIL, [place], [facility], [place], [facility], [place], [facility], [place]    Radiology:  All distal latencies left median nerve    ASSESSMENT AND PLAN:    Carpal tunnel decompression left      Electronically signed by Andres Lyons DO on 6/24/2021 at 7:18 AM

## 2021-06-24 NOTE — OP NOTE
Operative report        DATE OF PROCEDURE: 6/24/2021     SURGEON: Micheal Garcia    ASSISTANT: None    PREOPERATIVE DIAGNOSIS: Carpal tunnel syndrome left    POSTOPERATIVE DIAGNOSIS: Same    OPERATION: Release transverse carpal ligament with median nerve decompression left hand and wrist    ANESTHESIA: IV regional    ESTIMATED BLOOD LOSS: Scant    COMPLICATIONS: None    SPECIMENS: Was sent to pathology    HISTORY: The patient is a 78y.o. year old female with history of above preop diagnosis. I explained the risk, benefits, expected outcome, and alternatives to the procedure. Patient understands and is in agreement. PROCEDURE: Patient was brought the operating room after site side were identified. Adequate IV regional anesthetic was administered by anesthesia to the left upper extremity. The arm was then prepped and draped in sterile fashion. Incision was made ulnar to the thenar flexion crease and distal wrist flexion crease and deepened down through subcutaneous tissue. 2 and half power loupe magnification electrocautery were used throughout the procedure. The transverse carpal ligament was encountered beneath a thick layer of fat. The ligament was incised in the midportion with a scalpel and complete release was accomplished proximal distal.  The ligament was very thick and tight distally and complete release was accomplished in this area. After complete decompression was confirmed the area was thoroughly irrigated and excess fluid was expressed and instruments were withdrawn. The wound was then closed with 6-0 Prolene simple erupted sutures. Xeroform gauze was placed and a bulky dressing was applied with overlying Ace bandage. The tourniquet was deflated and circulation returned to the upper extremity well. The patient was then taken recovery in stable condition.     GROSS PATHOLOGY: Thick tight transverse carpal ligament with median nerve impression left hand and wrist.  The worst of this at the distal extent of the ligament. All reasonable efforts have been made to minimize the risk of errors that may occur in the use of voice recognition and other electronic means of charting.       Electronically signed by Orlin Matthew DO on 6/24/21 at 10:10 AM EDT

## 2021-07-07 ENCOUNTER — OFFICE VISIT (OUTPATIENT)
Dept: ORTHOPEDIC SURGERY | Age: 71
End: 2021-07-07

## 2021-07-07 VITALS — WEIGHT: 234 LBS | BODY MASS INDEX: 43.06 KG/M2 | HEIGHT: 62 IN | TEMPERATURE: 98 F

## 2021-07-07 DIAGNOSIS — G56.03 BILATERAL CARPAL TUNNEL SYNDROME: Primary | ICD-10-CM

## 2021-07-07 PROCEDURE — 99024 POSTOP FOLLOW-UP VISIT: CPT | Performed by: ORTHOPAEDIC SURGERY

## 2021-07-07 NOTE — PROGRESS NOTES
tablet Take 20 mg by mouth daily       dilTIAZem (CARDIZEM CD) 120 MG extended release capsule Take 120 mg by mouth daily am      folic acid (FOLVITE) 1 MG tablet Take 1 mg by mouth daily      hydroCHLOROthiazide (HYDRODIURIL) 25 MG tablet Take 25 mg by mouth daily      losartan (COZAAR) 100 MG tablet Take 100 mg by mouth daily am      Multiple Vitamin (MULTIVITAMIN ADULT PO) Take by mouth daily      MEGARED OMEGA-3 KRILL OIL PO Take by mouth daily      L-methylfolate-B6-B12 (METANX) 9-22.914-5-99 MG CAPS capsule Take by mouth daily       No current facility-administered medications for this visit.        Patient Active Problem List   Diagnosis    Carpal tunnel syndrome, left       Past Medical History:   Diagnosis Date    Arthritis     rheumatoid    Asthma     Diabetes mellitus ([address]     diet controlled    Hyperlipidemia     Hypertension        Past Surgical History:   Procedure Laterality Date    APPENDECTOMY      CARPAL TUNNEL RELEASE Left 6/24/2021    RELEASE TRANSVERSE CARPAL LIGAMENT-LEFT performed by Giuseppe Goldberg DO at [address] [place] [address] COLONOSCOPY      ENDOSCOPY, COLON, DIAGNOSTIC      GALLBLADDER SURGERY      HEEL SPUR SURGERY Left        Allergies   Allergen Reactions    Lisinopril Other (See Comments)     Caused a cough    Other Other (See Comments)     Suldene  caaused disoriention       Social History     Socioeconomic History    Marital status: Single     Spouse name: None    Number of children: None    Years of education: None    Highest education level: None   Occupational History    None   Tobacco Use    Smoking status: Never Smoker    Smokeless tobacco: Never Used   Vaping Use    Vaping Use: Never used   Substance and Sexual Activity    Alcohol use: Not Currently    Drug use: Never    Sexual activity: None   Other Topics Concern    None   Social History Narrative    None     Social Determinants of Health     Financial Resource Strain:     Difficulty of Paying Living Expenses:    Food Insecurity:     Worried About [facility] in the Last Year:     [address] in the Last Year:    Transportation Needs:     Lack of Transportation (Medical):  Lack of Transportation (Non-Medical):    Physical Activity:     Days of Exercise per Week:     Minutes of Exercise per Session:    Stress:     Feeling of Stress :    Social Connections:     Frequency of Communication with Friends and Family:     Frequency of Social Gatherings with Friends and Family:     Attends Rastafarian Services:     Active Member of Clubs or Organizations:     Attends Club or Organization Meetings:     Marital Status:    Intimate Partner Violence:     Fear of Current or Ex-Partner:     Emotionally Abused:     Physically Abused:     Sexually Abused:        Review of Systems  As follows except as previously noted in HPI:  Constitutional: Negative for chills, diaphoresis, fatigue, fever and unexpected weight change. Respiratory: Negative for cough, shortness of breath and wheezing. Cardiovascular: Negative for chest pain and palpitations. Neurological: Negative for dizziness, syncope, cephalgia. GI / : negative  Musculoskeletal: see HPI       Objective:   Physical Exam   Constitutional: Oriented to person, place, and time. and appears well-developed and well-nourished. :   Head: Normocephalic and atraumatic. Eyes: EOM are normal.   Neck: Neck supple. Cardiovascular: Normal rate and regular rhythm. Pulmonary/Chest: Effort normal. No stridor. No respiratory distress, no wheezes. Abdominal:  No abnormal distension. Neurological: Alert and oriented to person, place, and time. Skin: Skin is warm and dry. Psychiatric: Normal mood and affect.  Behavior is normal. Thought content normal.    TINY Short DO    7/7/21  11:14 AM

## 2021-07-28 ENCOUNTER — OFFICE VISIT (OUTPATIENT)
Dept: ORTHOPEDIC SURGERY | Age: 71
End: 2021-07-28

## 2021-07-28 VITALS — HEIGHT: 62 IN | WEIGHT: 230 LBS | BODY MASS INDEX: 42.33 KG/M2 | TEMPERATURE: 98 F

## 2021-07-28 DIAGNOSIS — G56.03 BILATERAL CARPAL TUNNEL SYNDROME: Primary | ICD-10-CM

## 2021-07-28 PROCEDURE — 99024 POSTOP FOLLOW-UP VISIT: CPT | Performed by: ORTHOPAEDIC SURGERY

## 2021-07-28 NOTE — PROGRESS NOTES
daily      hydroCHLOROthiazide (HYDRODIURIL) 25 MG tablet Take 25 mg by mouth daily      losartan (COZAAR) 100 MG tablet Take 100 mg by mouth daily am      Multiple Vitamin (MULTIVITAMIN ADULT PO) Take by mouth daily      MEGARED OMEGA-3 KRILL OIL PO Take by mouth daily      L-methylfolate-B6-B12 (METANX) 6-30.527-2-49 MG CAPS capsule Take by mouth daily       No current facility-administered medications for this visit.        Patient Active Problem List   Diagnosis    Carpal tunnel syndrome, left       Past Medical History:   Diagnosis Date    Arthritis     rheumatoid    Asthma     Diabetes mellitus ([address]     diet controlled    Hyperlipidemia     Hypertension        Past Surgical History:   Procedure Laterality Date    APPENDECTOMY      CARPAL TUNNEL RELEASE Left 6/24/2021    RELEASE TRANSVERSE CARPAL LIGAMENT-LEFT performed by Luis M Madera DO at [address] [facility] COLONOSCOPY      ENDOSCOPY, COLON, DIAGNOSTIC      GALLBLADDER SURGERY      HEEL SPUR SURGERY Left        Allergies   Allergen Reactions    Lisinopril Other (See Comments)     Caused a cough    Other Other (See Comments)     Suldene  caaused disoriention       Social History     Socioeconomic History    Marital status: Single     Spouse name: None    Number of children: None    Years of education: None    Highest education level: None   Occupational History    None   Tobacco Use    Smoking status: Never Smoker    Smokeless tobacco: Never Used   Vaping Use    Vaping Use: Never used   Substance and Sexual Activity    Alcohol use: Not Currently    Drug use: Never    Sexual activity: None   Other Topics Concern    None   Social History Narrative    None     Social Determinants of Health     Financial Resource Strain:     Difficulty of Paying Living Expenses:    Food Insecurity:     Worried About Running Out of Food in the Last Year:     Génesis of Food in the Last Year:    Transportation Needs:     Lack of Transportation (Medical):  Lack of Transportation (Non-Medical):    Physical Activity:     Days of Exercise per Week:     Minutes of Exercise per Session:    Stress:     Feeling of Stress :    Social Connections:     Frequency of Communication with Friends and Family:     Frequency of Social Gatherings with Friends and Family:     Attends Druze Services:     Active Member of Clubs or Organizations:     Attends Club or Organization Meetings:     Marital Status:    Intimate Partner Violence:     Fear of Current or Ex-Partner:     Emotionally Abused:     Physically Abused:     Sexually Abused:        Review of Systems  As follows except as previously noted in HPI:  Constitutional: Negative for chills, diaphoresis, fatigue, fever and unexpected weight change. Respiratory: Negative for cough, shortness of breath and wheezing. Cardiovascular: Negative for chest pain and palpitations. Neurological: Negative for dizziness, syncope, cephalgia. GI / : negative  Musculoskeletal: see HPI       Objective:   Physical Exam   Constitutional: Oriented to person, place, and time. and appears well-developed and well-nourished. :   Head: Normocephalic and atraumatic. Eyes: EOM are normal.   Neck: Neck supple. Cardiovascular: Normal rate and regular rhythm. Pulmonary/Chest: Effort normal. No stridor. No respiratory distress, no wheezes. Abdominal:  No abnormal distension. Neurological: Alert and oriented to person, place, and time. Skin: Skin is warm and dry. Psychiatric: Normal mood and affect.  Behavior is normal. Thought content normal.    TINY Resendiz DO    7/28/21  11:26 AM

## 2024-06-25 ENCOUNTER — HOSPITAL ENCOUNTER (OUTPATIENT)
Dept: RADIOLOGY | Facility: CLINIC | Age: 74
Discharge: HOME | End: 2024-06-25
Payer: MEDICARE

## 2024-06-25 ENCOUNTER — OFFICE VISIT (OUTPATIENT)
Dept: OBSTETRICS AND GYNECOLOGY | Facility: CLINIC | Age: 74
End: 2024-06-25
Payer: MEDICARE

## 2024-06-25 VITALS — HEIGHT: 61 IN | BODY MASS INDEX: 43.05 KG/M2 | WEIGHT: 228 LBS

## 2024-06-25 VITALS
SYSTOLIC BLOOD PRESSURE: 118 MMHG | BODY MASS INDEX: 43.2 KG/M2 | HEIGHT: 61 IN | DIASTOLIC BLOOD PRESSURE: 70 MMHG | WEIGHT: 228.8 LBS

## 2024-06-25 DIAGNOSIS — M85.80 OSTEOPENIA AFTER MENOPAUSE: ICD-10-CM

## 2024-06-25 DIAGNOSIS — Z12.31 ENCOUNTER FOR SCREENING MAMMOGRAM FOR MALIGNANT NEOPLASM OF BREAST: ICD-10-CM

## 2024-06-25 DIAGNOSIS — N76.3 CHRONIC VULVITIS: ICD-10-CM

## 2024-06-25 DIAGNOSIS — Z78.0 OSTEOPENIA AFTER MENOPAUSE: ICD-10-CM

## 2024-06-25 DIAGNOSIS — N39.46 MIXED INCONTINENCE: ICD-10-CM

## 2024-06-25 DIAGNOSIS — N95.1 MENOPAUSAL SYMPTOM: Primary | ICD-10-CM

## 2024-06-25 PROCEDURE — 1160F RVW MEDS BY RX/DR IN RCRD: CPT

## 2024-06-25 PROCEDURE — 99213 OFFICE O/P EST LOW 20 MIN: CPT

## 2024-06-25 PROCEDURE — 77067 SCR MAMMO BI INCL CAD: CPT

## 2024-06-25 PROCEDURE — 1126F AMNT PAIN NOTED NONE PRSNT: CPT

## 2024-06-25 PROCEDURE — 1159F MED LIST DOCD IN RCRD: CPT

## 2024-06-25 PROCEDURE — 77063 BREAST TOMOSYNTHESIS BI: CPT | Performed by: RADIOLOGY

## 2024-06-25 PROCEDURE — 1036F TOBACCO NON-USER: CPT

## 2024-06-25 PROCEDURE — 77067 SCR MAMMO BI INCL CAD: CPT | Performed by: RADIOLOGY

## 2024-06-25 RX ORDER — DILTIAZEM HYDROCHLORIDE 120 MG/1
CAPSULE, COATED, EXTENDED RELEASE ORAL
COMMUNITY
Start: 2024-05-05

## 2024-06-25 RX ORDER — NAPROXEN SODIUM 220 MG/1
81 TABLET, FILM COATED ORAL
COMMUNITY

## 2024-06-25 RX ORDER — AZELASTINE 1 MG/ML
SPRAY, METERED NASAL EVERY 12 HOURS
COMMUNITY

## 2024-06-25 RX ORDER — ALLOPURINOL 300 MG/1
300 TABLET ORAL
COMMUNITY
Start: 2024-04-25

## 2024-06-25 RX ORDER — FLUTICASONE PROPIONATE 50 MCG
SPRAY, SUSPENSION (ML) NASAL
COMMUNITY
Start: 2024-04-25

## 2024-06-25 RX ORDER — FOLIC ACID 1 MG/1
1 TABLET ORAL
COMMUNITY
Start: 2024-05-17 | End: 2025-05-17

## 2024-06-25 RX ORDER — HYDROCHLOROTHIAZIDE 25 MG/1
25 TABLET ORAL
COMMUNITY

## 2024-06-25 RX ORDER — METHOTREXATE 25 MG/ML
15 INJECTION, SOLUTION INTRA-ARTERIAL; INTRAMUSCULAR; INTRAVENOUS WEEKLY
COMMUNITY
Start: 2024-05-17 | End: 2024-10-04

## 2024-06-25 RX ORDER — DICLOFENAC SODIUM 10 MG/G
4 GEL TOPICAL 4 TIMES DAILY
COMMUNITY
Start: 2024-05-17 | End: 2024-08-15

## 2024-06-25 RX ORDER — BLOOD-GLUCOSE METER
KIT MISCELLANEOUS
COMMUNITY
Start: 2024-05-12

## 2024-06-25 RX ORDER — ATORVASTATIN CALCIUM 20 MG/1
TABLET, FILM COATED ORAL EVERY 24 HOURS
COMMUNITY

## 2024-06-25 RX ORDER — LOSARTAN POTASSIUM 100 MG/1
TABLET ORAL
COMMUNITY
Start: 2024-04-08

## 2024-06-25 ASSESSMENT — PATIENT HEALTH QUESTIONNAIRE - PHQ9
SUM OF ALL RESPONSES TO PHQ9 QUESTIONS 1 & 2: 0
2. FEELING DOWN, DEPRESSED OR HOPELESS: NOT AT ALL
1. LITTLE INTEREST OR PLEASURE IN DOING THINGS: NOT AT ALL

## 2024-06-25 ASSESSMENT — ENCOUNTER SYMPTOMS
FATIGUE: 0
CHILLS: 0
VOMITING: 0
COUGH: 0
HEADACHES: 0
FEVER: 0
DYSURIA: 0
DEPRESSION: 0
COLOR CHANGE: 0
UNEXPECTED WEIGHT CHANGE: 0
NAUSEA: 0
ABDOMINAL PAIN: 0
SHORTNESS OF BREATH: 0
LOSS OF SENSATION IN FEET: 0
OCCASIONAL FEELINGS OF UNSTEADINESS: 0
DIZZINESS: 0

## 2024-06-25 ASSESSMENT — LIFESTYLE VARIABLES
HOW OFTEN DO YOU HAVE SIX OR MORE DRINKS ON ONE OCCASION: NEVER
HOW OFTEN DO YOU HAVE A DRINK CONTAINING ALCOHOL: MONTHLY OR LESS
AUDIT-C TOTAL SCORE: 1
HOW MANY STANDARD DRINKS CONTAINING ALCOHOL DO YOU HAVE ON A TYPICAL DAY: 1 OR 2
SKIP TO QUESTIONS 9-10: 1

## 2024-06-25 ASSESSMENT — PAIN SCALES - GENERAL: PAINLEVEL: 0-NO PAIN

## 2024-06-25 NOTE — PATIENT INSTRUCTIONS
-No further need for pap smears based on age and history.  -Due for mammogram, ordered to complete.  -Next due for DEXA (bone density scan) 1/2025.   -Continue use of Aquaphor as needed to the external vulvar skin/tissues.   -You can use Clobetasol as needed.   -I'd recommend Kegel exercises 25 times twice daily to help strengthen your pelvic floor muscles and help with any urinary leakage. If it continues to persist, you are developing urinary infections, or it becomes a sanitary concern, I'd recommend seeing urology for further evaluation.

## 2024-06-25 NOTE — PROGRESS NOTES
"Subjective   Payal Art is a 73 y.o. female who is here for a routine menopausal visit. Last saw Dr. Multani 10/2022. Overall doing well. Denies vaginal bleeding. Denies pelvic pain, pressure, or bloating. Denies breast changes or concerns. Still with urinary leakage/incontinence - stress and urgency/overflow; she notices it worsens if she drinks more liquids. Denies dysuria, frequency, or hematuria. Denies f/c/n/v. Denies hx of recurrent UTIs.      Complaints:  urinary leakage/incontinence  HRT: no  History of abnormal Pap smear: no  History of abnormal mammogram: no      OB History          0    Para   0    Term   0       0    AB   0    Living   0         SAB   0    IAB   0    Ectopic   0    Multiple   0    Live Births   0           Obstetric Comments   Has 2 adopted children                Review of Systems   Constitutional:  Negative for chills, fatigue, fever and unexpected weight change.   Respiratory:  Negative for cough and shortness of breath.    Gastrointestinal:  Negative for abdominal pain, nausea and vomiting.   Genitourinary:  Negative for dyspareunia, dysuria, pelvic pain and vaginal discharge.        + incontinence (stress, overflow/urgency)   Skin:  Negative for color change and rash.   Neurological:  Negative for dizziness and headaches.       Objective   /70   Ht 1.556 m (5' 1.25\")   Wt 104 kg (228 lb 12.8 oz)   BMI 42.88 kg/m²        General:   Alert and oriented, in no acute distress   Neck: Supple. No visible thyromegaly.    Breast/Axilla: Normal to palpation bilaterally without masses, skin changes, or nipple discharge.    Abdomen: Soft, non-tender, without masses or organomegaly   Vulva: Normal architecture without erythema, masses, or lesions.    Vagina: Normal mucosa without lesions, masses, or atrophy. No abnormal vaginal discharge.    Cervix: Normal without masses, lesions, or signs of cervicitis   Uterus: Normal, mobile, non-enlarged uterus; limited due to body " habitus   Adnexa: Normal without masses or lesions; limited due to body habitus   Pelvic Floor Normal    Psych Normal affect. Normal mood.      Assessment/Plan   -No further need for pap smears based on age and history.  -Due for mammogram, ordered to complete.  -Next due for DEXA (bone density scan) 1/2025; hx osteopenia.   -Continue use of Aquaphor as needed to the external vulvar skin/tissues.   -Continue use of Clobetasol prn.   -Recommended kegel exercises 25 times twice daily to help strengthen pelvic floor muscles and help with any urinary leakage. If it continues to persist, pt developing urinary infections, or it becomes a sanitary concern, I advised she call the office.    Michelle Castanon PA-C

## 2025-02-28 DIAGNOSIS — N76.3 CHRONIC VULVITIS: Primary | ICD-10-CM

## 2025-02-28 RX ORDER — CLOBETASOL PROPIONATE 0.5 MG/G
OINTMENT TOPICAL
COMMUNITY
End: 2025-02-28 | Stop reason: SDUPTHER

## 2025-02-28 RX ORDER — CLOBETASOL PROPIONATE 0.5 MG/G
OINTMENT TOPICAL
Qty: 30 G | Refills: 1 | Status: SHIPPED | OUTPATIENT
Start: 2025-02-28

## 2025-02-28 NOTE — TELEPHONE ENCOUNTER
Patient is requesting refill on Clobetasol  .  Need sent to [facility]  on file

## 2025-07-26 ENCOUNTER — OFFICE VISIT (OUTPATIENT)
Dept: URGENT CARE | Facility: URGENT CARE | Age: 75
End: 2025-07-26
Payer: MEDICARE

## 2025-07-26 VITALS
OXYGEN SATURATION: 93 % | WEIGHT: 220 LBS | RESPIRATION RATE: 18 BRPM | SYSTOLIC BLOOD PRESSURE: 146 MMHG | HEART RATE: 92 BPM | BODY MASS INDEX: 41.23 KG/M2 | TEMPERATURE: 98.5 F | DIASTOLIC BLOOD PRESSURE: 85 MMHG

## 2025-07-26 DIAGNOSIS — U07.1 COVID-19: Primary | ICD-10-CM

## 2025-07-26 DIAGNOSIS — R05.1 ACUTE COUGH: ICD-10-CM

## 2025-07-26 DIAGNOSIS — R50.9 FEVER, UNSPECIFIED FEVER CAUSE: ICD-10-CM

## 2025-07-26 DIAGNOSIS — R09.81 CONGESTION OF NASAL SINUS: ICD-10-CM

## 2025-07-26 LAB
POC CORONAVIRUS SARS-COV-2 PCR: POSITIVE
POC HUMAN RHINOVIRUS PCR: NEGATIVE
POC INFLUENZA A VIRUS PCR: NEGATIVE
POC INFLUENZA B VIRUS PCR: NEGATIVE
POC RESPIRATORY SYNCYTIAL VIRUS PCR: NEGATIVE

## 2025-07-26 RX ORDER — FLUTICASONE PROPIONATE 50 MCG
2 SPRAY, SUSPENSION (ML) NASAL DAILY
Qty: 16 G | Refills: 0 | Status: SHIPPED | OUTPATIENT
Start: 2025-07-26 | End: 2026-07-26

## 2025-07-26 RX ORDER — BENZONATATE 200 MG/1
200 CAPSULE ORAL 3 TIMES DAILY PRN
Qty: 21 CAPSULE | Refills: 0 | Status: SHIPPED | OUTPATIENT
Start: 2025-07-26 | End: 2025-08-02

## 2025-07-26 ASSESSMENT — ENCOUNTER SYMPTOMS
TROUBLE SWALLOWING: 0
MYALGIAS: 0
WEAKNESS: 0
BACK PAIN: 0
COUGH: 1
DIFFICULTY URINATING: 0
DIZZINESS: 0
WOUND: 0
CHILLS: 0
COLOR CHANGE: 0
FATIGUE: 0
FEVER: 0
NECK PAIN: 0
DYSURIA: 0
CONFUSION: 0
SINUS PRESSURE: 0
CHEST TIGHTNESS: 0
FREQUENCY: 0
HEADACHES: 0
SINUS PAIN: 0
ABDOMINAL PAIN: 0
LIGHT-HEADEDNESS: 0
ACTIVITY CHANGE: 0
DIARRHEA: 0
WHEEZING: 0
SORE THROAT: 0
RHINORRHEA: 1
SHORTNESS OF BREATH: 0
NECK STIFFNESS: 0
DIAPHORESIS: 0
PALPITATIONS: 0
NAUSEA: 0
VOMITING: 0
APPETITE CHANGE: 0

## 2025-07-26 NOTE — PATIENT INSTRUCTIONS
Increase fluids  Eat a well balanced diet   Tylenol or motrin for fevers  Rx medications or over the counter meds as discussed or ordered  Read and follow preprinted instructions  As always you are invited to return if your condition should change or worsen in any way  If your condition worsens or becomes severe or life threatening, please go to the nearest emergency department  Follow up with your family dr in 3 days if no better.   It must be noted in this discharge instruction and I did offer you a antiviral Paxlovid and you declined.

## 2025-07-26 NOTE — PROGRESS NOTES
Subjective   Patient ID: Payal Art is a 74 y.o. female. They present today with a chief complaint of Nasal Congestion (1 day ), Cough (1 day ), and Fever (1 day ).    History of Present Illness  Chief complaint: Sinus congestion with cough      HPI: Onset above symptoms x 18 hours without fevers chills nausea vomiting diarrhea chest pain or shortness of breath.  She states severe nasal congestion without facial pain frontal pain or sinus pressure.  No sick contacts.  Vaccinated against influenza and COVID.  Does not smoke.  Not utilizing anything over-the-counter for the symptoms.  Normal intake and output otherwise.      Nurses notes, vitals, medications and PMH reviewed.       History provided by:  Patient   used: No    Cough  Associated symptoms include postnasal drip and rhinorrhea. Pertinent negatives include no chest pain, chills, ear pain, fever, headaches, myalgias, sore throat, shortness of breath or wheezing.   Fever   Associated symptoms include congestion and coughing. Pertinent negatives include no abdominal pain, chest pain, diarrhea, ear pain, headaches, nausea, sore throat, urinary pain, vomiting or wheezing.       Past Medical History  Allergies as of 07/26/2025 - Reviewed 07/26/2025   Allergen Reaction Noted    Lisinopril Other 05/21/2017    Seldane Other 05/21/2017       Prescriptions Prior to Admission[1]     Medical History[2]    Surgical History[3]     reports that she has never smoked. She has never used smokeless tobacco. She reports current alcohol use. She reports that she does not use drugs.    Review of Systems  Review of Systems   Constitutional:  Negative for activity change, appetite change, chills, diaphoresis, fatigue and fever.   HENT:  Positive for congestion, postnasal drip and rhinorrhea. Negative for drooling, ear pain, nosebleeds, sinus pressure, sinus pain, sneezing, sore throat and trouble swallowing.    Eyes:  Negative for visual disturbance.    Respiratory:  Positive for cough. Negative for chest tightness, shortness of breath and wheezing.    Cardiovascular:  Negative for chest pain, palpitations and leg swelling.   Gastrointestinal:  Negative for abdominal pain, diarrhea, nausea and vomiting.   Genitourinary:  Negative for decreased urine volume, difficulty urinating, dysuria, frequency and urgency.   Musculoskeletal:  Negative for back pain, myalgias, neck pain and neck stiffness.   Skin:  Negative for color change and wound.   Neurological:  Negative for dizziness, syncope, weakness, light-headedness and headaches.   Psychiatric/Behavioral:  Negative for confusion.                                   Objective    Vitals:    07/26/25 1326   BP: 146/85   Pulse: 92   Resp: 18   Temp: 36.9 °C (98.5 °F)   TempSrc: Oral   SpO2: 93%   Weight: 99.8 kg (220 lb)     No LMP recorded. Patient is postmenopausal.    Physical Exam  Vitals and nursing note reviewed.   Constitutional:       General: She is awake. She is not in acute distress.     Appearance: Normal appearance. She is well-developed and well-groomed. She is not ill-appearing, toxic-appearing or diaphoretic.   HENT:      Head: Normocephalic and atraumatic.      Right Ear: Hearing, tympanic membrane, ear canal and external ear normal. There is no impacted cerumen. No mastoid tenderness.      Left Ear: Hearing, tympanic membrane, ear canal and external ear normal. There is no impacted cerumen. No mastoid tenderness.      Nose: Mucosal edema and rhinorrhea present. Rhinorrhea is clear.      Right Turbinates: Enlarged and swollen.      Left Turbinates: Enlarged and swollen.      Right Sinus: No maxillary sinus tenderness or frontal sinus tenderness.      Left Sinus: No maxillary sinus tenderness or frontal sinus tenderness.      Comments: Bilateral turbinates moderately erythematous and edematous with copious amounts of clear drainage.  Negative for frontal maxillary or ethmoid sinus tenderness.  No  periorbital edema or erythema.     Mouth/Throat:      Lips: Pink.      Mouth: Mucous membranes are moist. No angioedema.      Pharynx: Oropharynx is clear. Uvula midline. No posterior oropharyngeal erythema or uvula swelling.      Tonsils: No tonsillar exudate or tonsillar abscesses. 0 on the right. 0 on the left.     Eyes:      General: No scleral icterus.     Conjunctiva/sclera: Conjunctivae normal.       Cardiovascular:      Rate and Rhythm: Normal rate and regular rhythm.      Pulses: Normal pulses.      Heart sounds: Normal heart sounds.   Pulmonary:      Effort: Pulmonary effort is normal.      Breath sounds: Normal breath sounds and air entry.      Comments: No adventitious sounds heard bronchial bronchovesicular vesicular regions.  Abdominal:      General: Bowel sounds are normal.      Palpations: Abdomen is soft.      Tenderness: There is no abdominal tenderness. There is no right CVA tenderness or left CVA tenderness.     Musculoskeletal:      Right lower leg: No edema.      Left lower leg: No edema.   Lymphadenopathy:      Head:      Right side of head: No submental, submandibular, tonsillar, preauricular, posterior auricular or occipital adenopathy.      Left side of head: No submental, submandibular, tonsillar, preauricular, posterior auricular or occipital adenopathy.      Cervical: No cervical adenopathy.     Skin:     General: Skin is warm.      Capillary Refill: Capillary refill takes less than 2 seconds.     Neurological:      Mental Status: She is alert and oriented to person, place, and time.      GCS: GCS eye subscore is 4. GCS verbal subscore is 5. GCS motor subscore is 6.     Psychiatric:         Mood and Affect: Mood normal.         Behavior: Behavior normal. Behavior is cooperative.         Thought Content: Thought content normal.         Judgment: Judgment normal.         Procedures    Point of Care Test & Imaging Results from this visit  No results found for this visit on 07/26/25.    Imaging  No results found.    Cardiology, Vascular, and Other Imaging  No other imaging results found for the past 2 days      Diagnostic study results (if any) were reviewed by YOHANA Torres.    Assessment/Plan   Allergies, medications, history, and pertinent labs/EKGs/Imaging reviewed by YOHANA Torres.     Medical Decision Making  HPI: SEE NARRATIVE  HISTORIAN: Pateint  INDEPENDENT HISTORIAN:   RECORDS REVIEWED:  DIFFERENTIAL DX: Influenza versus COVID versus rhinovirus.  Versus seasonal allergies versus viral sinusitis versus bacterial sinusitis  LABS: COVID spotfire testing = positive for COVID  XRAY:  EKG:  IN CLINIC MEDICATIONS: Offered in clinic RX medications for patient they declined stated they wanted it sent through their insurance through their pharmacy.    CONSULTED WITH:  DIAGNOSIS: See urgent care course of treatment  SEE URGENT CARE COURSE OF TREATMENT AND DOCUMENTATION FOR RX MEDS GIVEN.   PROCEDURES: SEE PROCEDURE NOTE    DISCUSSIONS WITH PATIENT = offered antivirals for patient in the form of Paxlovid and patient refused    ITEMS OFFERED TO PATIENT: OFFERED IN HOUSE MEDICATIONS AND PATIENT DECLINED.     Patient and or family were counselled on labs, radiological studies, and all testing applicable for this visit as well as diagnosis. Patient was discharged homewith stable afebrile non-toxic vital signs. They verbalized discharge instructions that were given to them BOTH written and verbally by myself.  They were given ample time to ask questionsand have none at time of disposition.    Orders and Diagnoses  Diagnoses and all orders for this visit:  Fever, unspecified fever cause  -     POCT SPOTFIRE R/ST Panel Mini w/COVID ([facility]) manually resulted  Acute cough  -     POCT SPOTFIRE R/ST Panel Mini w/COVID ([facility]) manually resulted  Congestion of nasal sinus  -     POCT SPOTFIRE R/ST Panel Mini w/COVID ([facility]) manually resulted      Medical Admin  Record      Patient disposition: Home    Electronically signed by YOHANA Torres  1:38 PM           [1] (Not in a hospital admission)  [2]   Past Medical History:  Diagnosis Date    Arthritis     Genetic testing 2021    [facility] GENETIC TESTING-- VUS OF PMS2 GENE    High cholesterol     Hypertension     Hypertension    [3]   Past Surgical History:  Procedure Laterality Date    CARPAL TUNNEL RELEASE  2021    CHOLECYSTECTOMY  1999    CYST REMOVAL  1978    HEEL SPUR EXCISION  2008    TONSILLECTOMY  1971

## (undated) DEVICE — PREP TRAY 10X5X2: Brand: MEDLINE INDUSTRIES, INC.

## (undated) DEVICE — SUTURE PROL SZ 6-0 L18IN NONABSORBABLE BLU L16MM PS-3 3/8 8680G

## (undated) DEVICE — 20 ML SYRINGE REGULAR TIP: Brand: MONOJECT

## (undated) DEVICE — PEN: MARKING STD 100/CS: Brand: MEDICAL ACTION INDUSTRIES

## (undated) DEVICE — ELECTROSURGICAL PENCIL BUTTON SWITCH NON COATED BLADE ELECTRODE 10 FT (3 M) CORD HOLSTER: Brand: MEGADYNE

## (undated) DEVICE — BASIC PACK: Brand: CONVERTORS

## (undated) DEVICE — BANDAGE COMPR W4XL108IN WHT LAYERED NO CLSR SYN RUB ESMARCH

## (undated) DEVICE — INTENDED FOR TISSUE SEPARATION, AND OTHER PROCEDURES THAT REQUIRE A SHARP SURGICAL BLADE TO PUNCTURE OR CUT.: Brand: BARD-PARKER ® STAINLESS STEEL BLADES

## (undated) DEVICE — GOWN SURG XL SMS FAB NONREINFORCED RAGLAN SLV HK LOOP CLSR

## (undated) DEVICE — PADDING CAST 4 YDX3 IN COTTON NS WBRL

## (undated) DEVICE — CLOTH SURG PREP PREOPERATIVE CHLORHEXIDINE GLUC 2% READYPREP

## (undated) DEVICE — HANDLE CVR PATENTED RETENTION DISC STRL LIGHT SHLD

## (undated) DEVICE — GLOVE,SURG,SENSICARE,ALOE,LF,PF,7: Brand: MEDLINE

## (undated) DEVICE — SOLUTION IV IRRIG POUR BRL 0.9% SODIUM CHL 2F7124

## (undated) DEVICE — 1810 FOAM BLOCK NEEDLE COUNTER: Brand: DEVON

## (undated) DEVICE — NEEDLE HYPO 25GA L1.5IN BLU POLYPR HUB S STL REG BVL STR

## (undated) DEVICE — GLOVE ORANGE PI 8   MSG9080

## (undated) DEVICE — GLOVE SURG SZ 65 L12IN FNGR THK94MIL STD WHT LTX FREE

## (undated) DEVICE — ELECTRODE NDL 2.8IN COAT VALLEYLAB

## (undated) DEVICE — ELECTRODE PT RET AD L9FT HI MOIST COND ADH HYDRGEL CORDED

## (undated) DEVICE — DRESSING GZ XRFRM 4X4(25/BX 6BX/CS)

## (undated) DEVICE — HOOK LOCK LATEX FREE ELASTIC BANDAGE 3INX5YD

## (undated) DEVICE — TOWEL OR BLUEE 16X26IN ST 8 PACK ORB08 16X26ORTWL

## (undated) DEVICE — GAUZE,SPONGE,4"X4",12PLY,STERILE,LF,2'S: Brand: MEDLINE

## (undated) DEVICE — SOLUTION IRRIG 1000ML 09% SOD CHL USP PIC PLAS CONTAINER